# Patient Record
Sex: FEMALE | Race: WHITE | NOT HISPANIC OR LATINO | Employment: OTHER | URBAN - METROPOLITAN AREA
[De-identification: names, ages, dates, MRNs, and addresses within clinical notes are randomized per-mention and may not be internally consistent; named-entity substitution may affect disease eponyms.]

---

## 2019-05-10 ENCOUNTER — OFFICE VISIT (OUTPATIENT)
Dept: FAMILY MEDICINE | Facility: CLINIC | Age: 79
End: 2019-05-10

## 2019-05-10 VITALS
RESPIRATION RATE: 20 BRPM | HEART RATE: 72 BPM | SYSTOLIC BLOOD PRESSURE: 146 MMHG | BODY MASS INDEX: 24.65 KG/M2 | DIASTOLIC BLOOD PRESSURE: 80 MMHG | WEIGHT: 144.38 LBS | TEMPERATURE: 98.2 F | HEIGHT: 64 IN

## 2019-05-10 DIAGNOSIS — N81.11 CYSTOCELE, MIDLINE: ICD-10-CM

## 2019-05-10 DIAGNOSIS — N95.2 ATROPHIC VAGINITIS: ICD-10-CM

## 2019-05-10 DIAGNOSIS — R30.0 DYSURIA: Primary | ICD-10-CM

## 2019-05-10 LAB
ALBUMIN (URINE): ABNORMAL MG/DL
APPEARANCE UR: ABNORMAL
BACTERIA, UR: ABNORMAL
BILIRUB UR QL: ABNORMAL
CASTS/LPF: ABNORMAL
COLOR UR: YELLOW
EP/HPF: ABNORMAL
GLUCOSE URINE: ABNORMAL MG/DL
HGB UR QL: ABNORMAL
KETONES UR QL: ABNORMAL MG/DL
LEUKOCYTE ESTERASE - QUEST: ABNORMAL
MISC.: ABNORMAL
NITRITE UR QL STRIP: ABNORMAL
PH UR STRIP: 6 PH (ref 5–7)
RBC, UR MICRO: ABNORMAL (ref ?–2)
SP. GRAVITY: 1.01
UROBILINOGEN UR QL STRIP: 0.2 EU/DL (ref 0.2–1)
WBC, UR MICRO: ABNORMAL (ref ?–2)

## 2019-05-10 PROCEDURE — 99213 OFFICE O/P EST LOW 20 MIN: CPT | Performed by: FAMILY MEDICINE

## 2019-05-10 PROCEDURE — 81001 URINALYSIS AUTO W/SCOPE: CPT | Performed by: FAMILY MEDICINE

## 2019-05-10 RX ORDER — LISINOPRIL 10 MG/1
10 TABLET ORAL DAILY
COMMUNITY
Start: 2019-05-10 | End: 2022-09-16

## 2019-05-10 RX ORDER — ATORVASTATIN CALCIUM 20 MG/1
20 TABLET, FILM COATED ORAL DAILY
COMMUNITY
Start: 2019-05-10 | End: 2022-09-16

## 2019-05-10 RX ORDER — UBIDECARENONE 30 MG
CAPSULE ORAL
COMMUNITY
Start: 2019-05-10

## 2019-05-10 ASSESSMENT — MIFFLIN-ST. JEOR: SCORE: 1120.93

## 2019-05-10 NOTE — NURSING NOTE
Ronnie HIWOT Ruffin is here for consult for a possible issue with her bladder.    Questioned patient about current smoking habits.  Pt. quit smoking some time ago.  PULSE regular  My Chart: declines  CLASSIFICATION OF OVERWEIGHT AND OBESITY BY BMI                        Obesity Class           BMI(kg/m2)  Underweight                                    < 18.5  Normal                                         18.5-24.9  Overweight                                     25.0-29.9  OBESITY                     I                  30.0-34.9                             II                 35.0-39.9  EXTREME OBESITY             III                >40                            Patient's  BMI Body mass index is 24.98 kg/m .  http://hin.nhlbi.nih.gov/menuplanner/menu.cgi  Pre-visit planning  Immunizations - up to date  Colonoscopy -   Mammogram -   Asthma -   PHQ9 -    PALAK-7 -

## 2019-05-10 NOTE — PROGRESS NOTES
"SUBJECTIVE:79 year old female presents with the following concern    History of vaginal prolapse/ cystocele, wears a pessary    Recently treated for her first UTI while in [place]  She recently returned to [place]  She complains of burning- irritation  Sees a \"third hole\" when looking at her anatomy    She has been prescribed vaginal estrogen- but does not like using  She removes her pessary- and reinserts with out difficulty    Patient Active Problem List   Diagnosis     Health Care Home     Essential hypertension, benign     Postmenopausal atrophic vaginitis     Disorder of bone and cartilage     Advance care planning     Past Surgical History:   Procedure Laterality Date     C DEXA,BONE DENSITY,APPENDICULR SKELTN       C VAGINAL HYSTERECTOMY      Hysterectomy, vaginal     HC COLONOSCOPY THRU STOMA, DIAGNOSTIC  1996    normal     HC REMOVAL OF TONSILS,<13 Y/O      Tonsillectomy, under 14 yrs     Current Outpatient Medications   Medication     aspirin 81 MG tablet     atorvastatin (LIPITOR) 20 MG tablet     Coenzyme Q10 (COQ10) 30 MG CAPS     lisinopril (PRINIVIL/ZESTRIL) 10 MG tablet     Multiple Vitamins-Minerals (OCUVITE ADULT FORMULA PO)     sulfamethoxazole-trimethoprim (BACTRIM DS/SEPTRA DS) 800-160 MG tablet     No current facility-administered medications for this visit.          OBJECTIVE:  /80 (BP Location: Right arm, Patient Position: Chair, Cuff Size: Adult Regular)   Pulse 72   Temp 98.2  F (36.8  C) (Oral)   Resp 20   Ht 1.619 m (5' 3.75\")   Wt 65.5 kg (144 lb 6.1 oz)   BMI 24.98 kg/m    No acute distress  Pessary is removed- atrophic vaginitis  No unusual discharge- no odor  Urethral caruncle-  Cystocele noted    UA : 5-10 rbc, no wbc    Assessment   No sign  Vaginal infection or UTI    PLAN:  Recommended using her vaginal estrogen and monitoring her symptoms  Follow up with GYN if persistent concerns               Just got back from bladder-  Just got treated for her first UTI in " [place]    Has estrogen cream- does not like to use

## 2019-05-14 ENCOUNTER — OFFICE VISIT (OUTPATIENT)
Dept: FAMILY MEDICINE | Facility: CLINIC | Age: 79
End: 2019-05-14

## 2019-05-14 VITALS
WEIGHT: 142 LBS | TEMPERATURE: 99 F | BODY MASS INDEX: 24.57 KG/M2 | SYSTOLIC BLOOD PRESSURE: 138 MMHG | OXYGEN SATURATION: 99 % | DIASTOLIC BLOOD PRESSURE: 60 MMHG | HEART RATE: 84 BPM

## 2019-05-14 DIAGNOSIS — N30.01 ACUTE CYSTITIS WITH HEMATURIA: Primary | ICD-10-CM

## 2019-05-14 LAB
ALBUMIN (URINE): 100 MG/DL
APPEARANCE UR: ABNORMAL
BACTERIA, UR: ABNORMAL
BILIRUB UR QL: ABNORMAL
CASTS/LPF: ABNORMAL
COLOR UR: YELLOW
EP/HPF: ABNORMAL
GLUCOSE URINE: ABNORMAL MG/DL
HGB UR QL: ABNORMAL
KETONES UR QL: ABNORMAL MG/DL
LEUKOCYTE ESTERASE - QUEST: ABNORMAL
MISC.: ABNORMAL
NITRITE UR QL STRIP: ABNORMAL
PH UR STRIP: 6 PH (ref 5–7)
RBC, UR MICRO: ABNORMAL (ref ?–2)
SP. GRAVITY: 1.01
UROBILINOGEN UR QL STRIP: 0.2 EU/DL (ref 0.2–1)
WBC, UR MICRO: ABNORMAL (ref ?–2)

## 2019-05-14 PROCEDURE — 81001 URINALYSIS AUTO W/SCOPE: CPT | Performed by: PHYSICIAN ASSISTANT

## 2019-05-14 PROCEDURE — 99213 OFFICE O/P EST LOW 20 MIN: CPT | Performed by: PHYSICIAN ASSISTANT

## 2019-05-14 RX ORDER — SULFAMETHOXAZOLE/TRIMETHOPRIM 800-160 MG
1 TABLET ORAL 2 TIMES DAILY
Qty: 14 TABLET | Refills: 0 | Status: SHIPPED | OUTPATIENT
Start: 2019-05-14 | End: 2020-04-09

## 2019-05-14 NOTE — PROGRESS NOTES
SUBJECTIVE:   Ronnie Ruffin is a 79 year old female who presents to clinic today for the following   health issues:      Concern - UTI  Was seen last week with Dr. Monteiro for UTI like sx - burning in vaginal area    Pt has cystocele for the last 4 years and pessary for the last 3.  She had hysterectomy approx 1977 and oophorectomy bilaterally (she can't recall why)      UTI on 4/5  In [place] -   Put on Macrobid   Sx resolved after tx.     Friday - developed sensation again  Burning sensation on labia  Pt told she had Polyp on urethra by Dr. Monteiro  Does have Overflow incontinence     Pt is Sexually active  No vaginal discharge or  odors  OTC: Monistat 3 days  About 10 days ago and this did not change her sx.     She did start using topical estrogen on Friday      Reviewed  and updated as needed this visit by clinical staff  Tobacco  Allergies  Meds  Problems         Reviewed and updated as needed this visit by Provider         Patient Active Problem List   Diagnosis     Health Care Home     Essential hypertension, benign     Postmenopausal atrophic vaginitis     Disorder of bone and cartilage     Advance care planning     Past Surgical History:   Procedure Laterality Date     C DEXA,BONE DENSITY,APPENDICULR SKELTN       C VAGINAL HYSTERECTOMY      Hysterectomy, vaginal     HC COLONOSCOPY THRU STOMA, DIAGNOSTIC  1996    normal     HC REMOVAL OF TONSILS,<13 Y/O      Tonsillectomy, under 12 yrs       Social History     Tobacco Use     Smoking status: Former Smoker     Smokeless tobacco: Never Used   Substance Use Topics     Alcohol use: Yes     Alcohol/week: 0.5 oz     Types: 1 drink(s) per week     Family History   Problem Relation Age of Onset     Cancer Father         lymphoma     Cancer Paternal Grandfather      Cerebrovascular Disease Paternal Grandfather      Heart Disease Maternal Aunt      Heart Disease Mother      Heart Disease Maternal Grandfather      Diabetes No family hx of          Current Outpatient  Medications   Medication Sig Dispense Refill     aspirin 81 MG tablet Take by mouth daily       atorvastatin (LIPITOR) 20 MG tablet Take 1 tablet (20 mg) by mouth daily       Coenzyme Q10 (COQ10) 30 MG CAPS        lisinopril (PRINIVIL/ZESTRIL) 10 MG tablet Take 1 tablet (10 mg) by mouth daily       Multiple Vitamins-Minerals (OCUVITE ADULT FORMULA PO) Take by mouth daily       sulfamethoxazole-trimethoprim (BACTRIM DS/SEPTRA DS) 800-160 MG tablet Take 1 tablet by mouth 2 times daily 14 tablet 0     Allergies   Allergen Reactions     Nitrofurazone      BP Readings from Last 3 Encounters:   05/14/19 138/60   05/10/19 146/80   06/24/16 130/70    Wt Readings from Last 3 Encounters:   05/14/19 64.4 kg (142 lb)   05/10/19 65.5 kg (144 lb 6.1 oz)   06/24/16 62.4 kg (137 lb 9.6 oz)                    ROS:  Constitutional, HEENT, cardiovascular, pulmonary, gi and gu systems are negative, except as otherwise noted.    OBJECTIVE:     /60 (BP Location: Right arm, Patient Position: Sitting, Cuff Size: Adult Regular)   Pulse 84   Temp 99  F (37.2  C) (Oral)   Wt 64.4 kg (142 lb)   SpO2 99%   BMI 24.57 kg/m    Body mass index is 24.57 kg/m .     GENERAL: healthy, alert and no distress  EYES: Eyes grossly normal to inspection, PERRL and conjunctivae and sclerae normal  NECK: no adenopathy, no asymmetry, masses, or scars and thyroid normal to palpation  RESP: lungs clear to auscultation - no rales, rhonchi or wheezes  CV: regular rate and rhythm, normal S1 S2, no S3 or S4, no murmur, click or rub, no peripheral edema and peripheral pulses strong  ABDOMEN: soft, nontender, no hepatosplenomegaly, no masses and bowel sounds normal  No flank tenderness  MS: no gross musculoskeletal defects noted, no edema  SKIN: no suspicious lesions or rashes  NEURO: Normal strength and tone, mentation intact and speech normal  PSYCH: mentation appears normal, affect normal/bright    Diagnostic Test Results:  Results for orders placed or  performed in visit on 05/14/19 (from the past 24 hour(s))   HCL URINALYSIS, ROUTINE   Result Value Ref Range    Color Urine Yellow     Appearance Urine Cloudy (A)     Glucose Urine Neg neg mg/dL    Bilirubin Urine Neg neg    Ketones Urine Neg neg mg/dL    Specific Gravity 1.015     Blood Urine Mod (A) neg    pH Urine 6.0 5.0 - 7.0 pH    Albumin Urine 100 (A) neg - neg mg/dL    Urobilinogen Urine 0.2 0.2 - 1.0 EU/dL    Nitrite Urine Neg NEG    Leukocyte Esterase SMALL (A) neg - neg    Wbc, Urine Micro 15-25 (A) neg - 2    RBC Micro Urine 4-8 (A) neg - 2    EP/HPF few     Bacteria Urine SMALL (A) neg - neg    Casts/LPF neg     Miscellaneous clumping of WBC's (A)        ASSESSMENT/PLAN:       (N30.01) Acute cystitis with hematuria  (primary encounter diagnosis)  Comment: new  Plan: Urine Culture Aerobic Bacterial, HCL         URINALYSIS, ROUTINE,         sulfamethoxazole-trimethoprim (BACTRIM         DS/SEPTRA DS) 800-160 MG tablet          UC in [place] showed susceptibility to Bactrim  BID for 7 days  UC pending  Push water  Discussed Cipro for potential pyelonephritis however due to Black Box warning will start with Bactrim as she is afebrile now.    Will call with lab culture             AYAH Orellana  [facility]

## 2019-05-14 NOTE — LETTER
May 17, 2019      Ronnie MI Augustin  [address] [address]        Dear ,    We are writing to inform you of your test results.    There was some bacteria in your urine culture and Bactrim was the appropriate antibiotic. Please return to the clinic if you symptoms continue after treatment.        Resulted Orders   Urine Culture Aerobic Bacterial   Result Value Ref Range    Urine Voided Culture SEE NOTE (A)       Comment:        CULTURE, URINE, ROUTINE         MICRO NUMBER:      [id number]    TEST STATUS:       FINAL    SPECIMEN SOURCE:   URINE    SPECIMEN QUALITY:  ADEQUATE    RESULT:            50,000-100,000 CFU/mL of Escherichia coli                            E.coli                            ----------------                            INT   SCOTT     AMOX/CLAVULANATE       S     <=2     AMPICILLIN             S     <=2     AMP/SULBACTAM          S     <=2     CEFAZOLIN              NR    <=4 **2     CEFEPIME               S     <=1     CEFTRIAXONE            S     <=1     CIPROFLOXACIN          S     <=0.25     ERTAPENEM              S     <=0.5     GENTAMICIN             S     <=1     IMIPENEM               S     <=0.25     LEVOFLOXACIN           S     <=0.12     NITROFURANTOIN         S     <=16     PIP/TAZOBACTAM         S     <=4     TOBRAMYCIN             S     <=1     TRIMETHOPRIM/SULFA     S     <=20  S=Susceptible  I=Intermediate  R=Resistant  * = Not Tested  NR = Not Reported  **NN = See Therapy C  omments  THERAPY COMMENTS      Note 1:      For infections other than uncomplicated UTI      caused by E. coli, K. pneumoniae or P. mirabilis:      Cefazolin is resistant if SCOTT > or = 8 mcg/mL.      (Distinguishing susceptible versus intermediate      for isolates with SCOTT < or = 4 mcg/mL requires      additional testing.)      Note 2:      For uncomplicated UTI caused by E. coli,      K. pneumoniae or P. mirabilis: Cefazolin is      susceptible if SCOTT <32 mcg/mL and predicts       susceptible to the oral agents cefaclor, cefdinir,      cefpodoxime, cefprozil, cefuroxime, cephalexin      and loracarbef.     HCL URINALYSIS, ROUTINE   Result Value Ref Range    Color Urine Yellow     Appearance Urine Cloudy (A)     Glucose Urine Neg neg mg/dL    Bilirubin Urine Neg neg    Ketones Urine Neg neg mg/dL    Specific Gravity 1.015     Blood Urine Mod (A) neg    pH Urine 6.0 5.0 - 7.0 pH    Albumin Urine 100 (A) neg - neg mg/dL    Urobilinogen Urine 0.2 0.2 - 1.0 EU/dL    Nitrite Urine Neg NEG    Leukocyte Esterase SMALL (A) neg - neg    Wbc, Urine Micro 15-25 (A) neg - 2    RBC Micro Urine 4-8 (A) neg - 2    EP/HPF few     Bacteria Urine SMALL (A) neg - neg    Casts/LPF neg     Miscellaneous clumping of WBC's (A)        If you have any questions or concerns, please call the clinic at the number listed above.       Sincerely,        AYAH Orellana

## 2019-05-16 LAB — URINE VOIDED CULTURE: ABNORMAL

## 2019-07-26 ENCOUNTER — TRANSFERRED RECORDS (OUTPATIENT)
Dept: FAMILY MEDICINE | Facility: CLINIC | Age: 79
End: 2019-07-26

## 2020-01-01 NOTE — NURSING NOTE
Ronnie is here for possible UTI has cystocele and has burning and fever    Pre-visit Screening:  Immunizations:  up to date  Colonoscopy:  NA dt age  Mammogram: NA d/t age  Asthma Action Test/Plan:  NA  PHQ9:  None  GAD7:  None  Questioned patient about current smoking habits Pt. has never smoked.  Ok to leave detailed message on voice mail for today's visit only Yes, phone #        Yes

## 2020-04-09 ENCOUNTER — OFFICE VISIT (OUTPATIENT)
Dept: FAMILY MEDICINE | Facility: CLINIC | Age: 80
End: 2020-04-09

## 2020-04-09 VITALS
DIASTOLIC BLOOD PRESSURE: 82 MMHG | HEART RATE: 66 BPM | RESPIRATION RATE: 18 BRPM | WEIGHT: 142.2 LBS | SYSTOLIC BLOOD PRESSURE: 124 MMHG | HEIGHT: 63 IN | OXYGEN SATURATION: 99 % | BODY MASS INDEX: 25.2 KG/M2 | TEMPERATURE: 98 F

## 2020-04-09 DIAGNOSIS — H00.11 CHALAZION OF RIGHT UPPER EYELID: Primary | ICD-10-CM

## 2020-04-09 DIAGNOSIS — H10.45 CHRONIC ALLERGIC CONJUNCTIVITIS: ICD-10-CM

## 2020-04-09 DIAGNOSIS — L90.0 LICHEN SCLEROSUS: ICD-10-CM

## 2020-04-09 PROBLEM — J31.0 CHRONIC RHINITIS: Status: ACTIVE | Noted: 2019-06-27

## 2020-04-09 PROBLEM — L80 VITILIGO: Status: ACTIVE | Noted: 2019-09-24

## 2020-04-09 PROCEDURE — 99213 OFFICE O/P EST LOW 20 MIN: CPT | Performed by: FAMILY MEDICINE

## 2020-04-09 RX ORDER — AZELASTINE HYDROCHLORIDE 0.5 MG/ML
1 SOLUTION/ DROPS OPHTHALMIC 2 TIMES DAILY
Qty: 6 ML | Refills: 0 | Status: SHIPPED | OUTPATIENT
Start: 2020-04-09 | End: 2024-05-16

## 2020-04-09 RX ORDER — ESTRADIOL 0.1 MG/G
CREAM VAGINAL
COMMUNITY
Start: 2020-02-11 | End: 2022-09-16

## 2020-04-09 RX ORDER — CLOBETASOL PROPIONATE 0.5 MG/G
OINTMENT TOPICAL
COMMUNITY
Start: 2020-02-11 | End: 2022-09-16

## 2020-04-09 ASSESSMENT — MIFFLIN-ST. JEOR: SCORE: 1090.17

## 2020-04-09 NOTE — PROGRESS NOTES
SUBJECTIVE: 80 year old female complaining of left eyelid irritation and itchy followed by some swelling and tenderness for 4 day(s).   The patient describes struggles with eyelid blepharitis wearing eyeliner over night and has a gentle shampoo wash her eyelids from [facility].   The patient denies a history of vision changes, discharge or orbital pain.   Smoking history: No.   Relevant past medical history: positive for chronic post nasal drainage with ? Allergies/ uses daily harish pot.    OBJECTIVE: The patient appears healthy, alert, no distress, cooperative, smiling and fatigued.  EYE:Left upper eyelid blepharitis with chalazion formation midline. Eyes are normal. PERRLA, corneas and conjunctivae normal. Fundi are normal, no papilledema, hemorrhages or exudates. No AV crossing changes. Clear tears    EARS: negative  NOSE/SINUS: positive findings: mucosa swollen, pale, and boggy, clear rhinorrhea   THROAT: normal   NECK:Neck supple. No adenopathy. Thyroid symmetric, normal size,, Carotids without bruits.   CHEST: Clear    ASSESSMENT: (H00.11) Chalazion of right upper eyelid  (primary encounter diagnosis)  Comment: war pack gently  Plan: azelastine (OPTIVAR) 0.05 % ophthalmic solution        Upper eyelid hygiene cleaning    (H10.45) Chronic allergic conjunctivitis  Plan: azelastine (OPTIVAR) 0.05 % ophthalmic solution        Potential medication side effects were discussed with the patient; let me know if any occur.      (L90.0) Lichen sclerosus  Plan: estradiol (ESTRACE) 0.1 MG/GM vaginal cream,         clobetasol (TEMOVATE) 0.05 % external ointment        I have reviewed the patient's medical history in detail and updated the computerized patient record.

## 2020-04-09 NOTE — NURSING NOTE
Ronnie is here today for a swollen eyelid.    Pre-visit Screening:  Immunizations:  up to date  Colonoscopy:  is up to date  Mammogram: is up to date  Asthma Action Test/Plan:  LUZ ELENA  PHQ9:  NA  GAD7:  NA  Questioned patient about current smoking habits Pt. quit smoking some time ago.  Ok to leave detailed message on voice mail for today's visit only Yes, phone # [phone]

## 2020-04-09 NOTE — PATIENT INSTRUCTIONS
Chalazion of right upper eyelid  (primary encounter diagnosis)  Comment: war pack gently  Plan: azelastine (OPTIVAR) 0.05 % ophthalmic solution        Upper eyelid hygiene cleaning    (H10.45) Chronic allergic conjunctivitis  Plan: azelastine (OPTIVAR) 0.05 % ophthalmic solution        Potential medication side effects were discussed with the patient; let me know if any occur.

## 2020-04-16 ENCOUNTER — OFFICE VISIT (OUTPATIENT)
Dept: FAMILY MEDICINE | Facility: CLINIC | Age: 80
End: 2020-04-16

## 2020-04-16 VITALS
WEIGHT: 143 LBS | OXYGEN SATURATION: 95 % | BODY MASS INDEX: 25.34 KG/M2 | DIASTOLIC BLOOD PRESSURE: 80 MMHG | TEMPERATURE: 98.4 F | HEIGHT: 63 IN | SYSTOLIC BLOOD PRESSURE: 126 MMHG | RESPIRATION RATE: 18 BRPM | HEART RATE: 74 BPM

## 2020-04-16 DIAGNOSIS — N36.2 URETHRAL CARUNCLE: ICD-10-CM

## 2020-04-16 DIAGNOSIS — N95.2 ATROPHIC VAGINITIS: ICD-10-CM

## 2020-04-16 DIAGNOSIS — R30.0 DYSURIA: Primary | ICD-10-CM

## 2020-04-16 LAB
ALBUMIN (URINE): ABNORMAL MG/DL
APPEARANCE UR: CLEAR
BACTERIA, UR: ABNORMAL
BILIRUB UR QL: ABNORMAL
CASTS/LPF: ABNORMAL
COLOR UR: YELLOW
EP/HPF: ABNORMAL
GLUCOSE URINE: ABNORMAL MG/DL
HGB UR QL: ABNORMAL
KETONES UR QL: ABNORMAL MG/DL
LEUKOCYTE ESTERASE - QUEST: ABNORMAL
MISC.: ABNORMAL
NITRITE UR QL STRIP: ABNORMAL
PH UR STRIP: 7 PH (ref 5–7)
RBC, UR MICRO: ABNORMAL (ref ?–2)
SP. GRAVITY: 1.01
UROBILINOGEN UR QL STRIP: 0.2 EU/DL (ref 0.2–1)
WBC, UR MICRO: ABNORMAL (ref ?–2)

## 2020-04-16 PROCEDURE — 81001 URINALYSIS AUTO W/SCOPE: CPT | Performed by: FAMILY MEDICINE

## 2020-04-16 PROCEDURE — 99213 OFFICE O/P EST LOW 20 MIN: CPT | Performed by: FAMILY MEDICINE

## 2020-04-16 ASSESSMENT — MIFFLIN-ST. JEOR: SCORE: 1093.8

## 2020-04-16 NOTE — PROGRESS NOTES
"SUBJECTIVE:   Ronnie Ruffin is an 80 year old year old who presents with suspected urinary tract   infection. Her symptoms began 3 days ago and   include pain when she has her pessary out and thinks she saw blood in her urine but mostly after she wiped on the toilet paper    Predisposing factors: atrophic vaginitis on estrogen, cystocele with removable pessary ring, urethral polyp  Hx of previous UTI s: occasional   Sexually active: yes,single partner, menopause    Current Outpatient Medications   Medication     atorvastatin (LIPITOR) 20 MG tablet     azelastine (OPTIVAR) 0.05 % ophthalmic solution     clobetasol (TEMOVATE) 0.05 % external ointment     Coenzyme Q10 (COQ10) 30 MG CAPS     estradiol (ESTRACE) 0.1 MG/GM vaginal cream     lisinopril (PRINIVIL/ZESTRIL) 10 MG tablet     No current facility-administered medications for this visit.           Allergies   Allergen Reactions     Nitrofurazone        OBJECTIVE:   Vitals:    04/16/20 1321   BP: 126/80   BP Location: Right arm   Patient Position: Sitting   Cuff Size: Adult Large   Pulse: 74   Temp: 98.4  F (36.9  C)   TempSrc: Oral   SpO2: 95%   Weight: 64.9 kg (143 lb)   Height: 1.594 m (5' 2.75\")       General appearance: Alert, oriented, well hydrated. Skin turgor normal.    Hydration: well hydrated   CVA tenderness to percussion: none  Abdomen: The abdomen is soft without tenderness, guarding, mass or organomegaly. Bowel sounds are normal. No CVA tenderness or inguinal adenopathy noted.    Tenderness: none   Masses: none  Organomegaly: none  GYN: Small soft blood tinged urethral caruncle. Vagina and vulva are normal;  no discharge is noted. Pessary in place without vaginal discharge/ dryness noted.  No vaginal mass or tenderness.  Adnexa normal in size without masses or tenderness.    UA:Urine dipstick shows positive for RBC's and positive for leukocytes.  Micro exam: 0-2 WBC's per HPF, 2-5 RBC's per HPF and few bacteria.     ASSESSMENT/PLAN: (R30.0) Dysuria "  (primary encounter diagnosis)  Comment: await culture  Plan: HCL  Urinalysis, Routine (BFP), Urine Culture         Aerobic Bacterial        Patient was instructed to drink plenty of fluids, urinate frequently and to contact the office promptly should she notice fever greater than 102, increase in discomfort, skin rash, lack of improvement after 2 days of treatment, or the appearance of new symptoms.      (N36.2) Urethral caruncle  Comment: reviewed the irritation I see  Plan: HCL  Urinalysis, Routine (BFP), Urine Culture         Aerobic Bacterial        Monitor symptoms. Consider a urology specialists/ ? carlos a    (N95.2) Atrophic vaginitis  Plan: use estrogen cream the next 3 nights.

## 2020-04-16 NOTE — NURSING NOTE
Ronnie is here today for a possible UTI.    Pre-visit Screening:  Immunizations:  up to date  Colonoscopy:  is up to date  Mammogram: is up to date  Asthma Action Test/Plan:  LUZ ELENA  PHQ9:  NA  GAD7:  NA  Questioned patient about current smoking habits Pt. has never smoked.  Ok to leave detailed message on voice mail for today's visit only Yes, phone # [phone]

## 2020-04-16 NOTE — PATIENT INSTRUCTIONS
Dysuria  (primary encounter diagnosis)  Comment: await culture  Plan: HCL  Urinalysis, Routine (BFP), Urine Culture         Aerobic Bacterial        Patient was instructed to drink plenty of fluids, urinate frequently and to contact the office promptly should she notice fever greater than 102, increase in discomfort, skin rash, lack of improvement after 2 days of treatment, or the appearance of new symptoms.      (N36.2) Urethral caruncle  Comment: reviewed the irritation I see  Plan: HCL  Urinalysis, Routine (BFP), Urine Culture         Aerobic Bacterial        Monitor symptoms. Consider a urology specialists/ ? carlos a    (N95.2) Atrophic vaginitis  Plan: use estrogen cream the next 3 nights.

## 2020-04-18 LAB — URINE VOIDED CULTURE: NORMAL

## 2020-04-20 ENCOUNTER — TELEPHONE (OUTPATIENT)
Dept: FAMILY MEDICINE | Facility: CLINIC | Age: 80
End: 2020-04-20

## 2020-04-20 DIAGNOSIS — R30.0 DYSURIA: Primary | ICD-10-CM

## 2020-04-20 DIAGNOSIS — N36.2 URETHRAL CARUNCLE: ICD-10-CM

## 2020-04-20 NOTE — TELEPHONE ENCOUNTER
Negative urine culture. Options reviewed.    [facility]   [address] [place], [place] [address] [phone] -- appt line   [phone] -- fax     She will schedule

## 2021-08-10 ENCOUNTER — OFFICE VISIT (OUTPATIENT)
Dept: FAMILY MEDICINE | Facility: CLINIC | Age: 81
End: 2021-08-10

## 2021-08-10 VITALS
HEIGHT: 63 IN | BODY MASS INDEX: 24.98 KG/M2 | WEIGHT: 141 LBS | OXYGEN SATURATION: 98 % | TEMPERATURE: 97.3 F | SYSTOLIC BLOOD PRESSURE: 120 MMHG | HEART RATE: 70 BPM | DIASTOLIC BLOOD PRESSURE: 68 MMHG

## 2021-08-10 DIAGNOSIS — Z96.0 VAGINAL PESSARY IN SITU: ICD-10-CM

## 2021-08-10 DIAGNOSIS — R30.0 DYSURIA: ICD-10-CM

## 2021-08-10 DIAGNOSIS — R31.9 HEMATURIA, UNSPECIFIED TYPE: Primary | ICD-10-CM

## 2021-08-10 DIAGNOSIS — N30.01 ACUTE CYSTITIS WITH HEMATURIA: ICD-10-CM

## 2021-08-10 DIAGNOSIS — N81.11 CYSTOCELE, MIDLINE: ICD-10-CM

## 2021-08-10 LAB
APPEARANCE UR: ABNORMAL
BACTERIA, UR: ABNORMAL
BILIRUB UR QL: ABNORMAL
CASTS/LPF: ABNORMAL
COLOR UR: ABNORMAL
EP/HPF: ABNORMAL
GLUCOSE URINE: ABNORMAL MG/DL
HGB UR QL: ABNORMAL
KETONES UR QL: ABNORMAL MG/DL
MISC.: ABNORMAL
NITRITE UR QL STRIP: ABNORMAL
PH UR STRIP: 5.5 PH (ref 5–7)
PROT UR QL: 100 MG/DL
RBC, UR MICRO: ABNORMAL (ref ?–2)
SP GR UR STRIP: ABNORMAL (ref 1–1.03)
UROBILINOGEN UR QL STRIP: 0.2 EU/DL (ref 0.2–1)
WBC #/AREA URNS HPF: ABNORMAL /[HPF]
WBC, UR MICRO: ABNORMAL (ref ?–2)

## 2021-08-10 PROCEDURE — 99213 OFFICE O/P EST LOW 20 MIN: CPT | Performed by: PHYSICIAN ASSISTANT

## 2021-08-10 PROCEDURE — 81001 URINALYSIS AUTO W/SCOPE: CPT | Performed by: PHYSICIAN ASSISTANT

## 2021-08-10 RX ORDER — NITROFURANTOIN 25; 75 MG/1; MG/1
100 CAPSULE ORAL 2 TIMES DAILY
Qty: 14 CAPSULE | Refills: 0 | Status: SHIPPED | OUTPATIENT
Start: 2021-08-10 | End: 2021-08-17

## 2021-08-10 ASSESSMENT — MIFFLIN-ST. JEOR: SCORE: 1079.73

## 2021-08-10 NOTE — NURSING NOTE
Chief Complaint   Patient presents with     UTI     Pt has urgency and blood in urine for 2 days.     Urinary Problem         Pre-visit Screening:  Immunizations:  up to date  Colonoscopy:  is up to date  Mammogram: is up to date  Asthma Action Test/Plan:  NA  PHQ9:  NA  GAD7:  NA  Questioned patient about current smoking habits Pt. quit smoking some time ago.  Ok to leave detailed message on voice mail for today's visit only Yes, phone # cell

## 2021-08-10 NOTE — PROGRESS NOTES
Chief Complaint   Patient presents with     UTI     Pt has urgency and blood in urine for 2 days.     Urinary Problem     SUBJECTIVE  Ronnie Ruffin in for a possible UTI.  Symptoms started 4 day(s) ago. Symptoms include  urgency, frequency, burning and voiding in small amounts  She denies suprapubic pain and pressure, back pain, nausea, vomiting, fever and chills. She does have a history of uti's as recently as 1/2021.  She denies any unusual vaginal discharge or odor.     She is sexually active.  LMP Postmenopause.     Recent abx use? No  Flank pain?  No    OTC treatment? Cranberry extract    Current Outpatient Medications   Medication     atorvastatin (LIPITOR) 20 MG tablet     azelastine (OPTIVAR) 0.05 % ophthalmic solution     clobetasol (TEMOVATE) 0.05 % external ointment     Coenzyme Q10 (COQ10) 30 MG CAPS     estradiol (ESTRACE) 0.1 MG/GM vaginal cream     lisinopril (PRINIVIL/ZESTRIL) 10 MG tablet     nitroFURantoin macrocrystal-monohydrate (MACROBID) 100 MG capsule     No current facility-administered medications for this visit.       Patient Active Problem List    Diagnosis Date Noted     Cystocele, midline 08/10/2021     Priority: Medium     Vaginal pessary in situ 08/10/2021     Priority: Medium     Lichen sclerosus 09/24/2019     Priority: Medium     Vitiligo 09/24/2019     Priority: Medium     Chronic rhinitis 06/27/2019     Priority: Medium     Advance care planning 01/27/2015     Priority: Medium     Health Care Home 06/20/2013     Priority: Medium     State Tier Level:  1  Status:  n/a  Care Coordinator:      See Letters for [facility] Care Plan           Essential hypertension, benign 06/20/2013     Priority: Medium     Postmenopausal atrophic vaginitis 06/20/2013     Priority: Medium     Disorder of bone and cartilage 06/20/2013     Priority: Medium     Problem list name updated by automated process. Provider to review         OBJECTIVE:    /68 (BP Location: Left arm, Patient Position: Sitting,  "Cuff Size: Adult Regular)   Pulse 70   Temp 97.3  F (36.3  C) (Temporal)   Ht 1.594 m (5' 2.75\")   Wt 64 kg (141 lb)   SpO2 98%   BMI 25.18 kg/m      Patient is a 81 year old female, in no acute distress. Vitals noted   Cardiac:  Normal rhythm and rate, no murmurs, rubs or gallops.  Lungs: clear to auscultation bilaterally; no wheezes, crackles or rhonchi  Abdomen:  Bowel sounds normal. Soft,  not distended, no masses, no hepatosplenomegaly, non-tender.  There is not CVA tenderness.    Urinalysis:WBCs, RBCs, Bacteria, nitrates     Culture pending?  Yes      ASSESSMENT:  1. Hematuria, unspecified type    2. Dysuria    3. Vaginal pessary in situ    4. Cystocele, midline    5. Acute cystitis with hematuria            PLAN:  UA consistent with cystitis. No evidence of pyelonephritis. Recommended start abx while culture pending.     Rx: nitrofurantoin BID 7 days. Tolerated in 2004.      Push fluids, frequent voiding, acetominophen/ibuprofen prn as tolerated.  Call or return to clinic prn if these symptoms worsen or fail to improve as anticipated.  Repeat UA indicated: No    Ronnie Ruffin understands and agrees with the plan.    Adriana Zavala PA-C      "

## 2021-08-12 LAB — URINE VOIDED CULTURE: ABNORMAL

## 2021-08-13 ENCOUNTER — TELEPHONE (OUTPATIENT)
Dept: FAMILY MEDICINE | Facility: CLINIC | Age: 81
End: 2021-08-13

## 2022-09-16 ENCOUNTER — OFFICE VISIT (OUTPATIENT)
Dept: FAMILY MEDICINE | Facility: CLINIC | Age: 82
End: 2022-09-16

## 2022-09-16 VITALS
TEMPERATURE: 97.2 F | WEIGHT: 144 LBS | DIASTOLIC BLOOD PRESSURE: 78 MMHG | SYSTOLIC BLOOD PRESSURE: 122 MMHG | RESPIRATION RATE: 20 BRPM | BODY MASS INDEX: 25.52 KG/M2 | HEIGHT: 63 IN | HEART RATE: 68 BPM

## 2022-09-16 DIAGNOSIS — L30.9 DERMATITIS: ICD-10-CM

## 2022-09-16 DIAGNOSIS — L90.0 LICHEN SCLEROSUS: ICD-10-CM

## 2022-09-16 DIAGNOSIS — N39.498 OTHER URINARY INCONTINENCE: ICD-10-CM

## 2022-09-16 DIAGNOSIS — N95.2 POSTMENOPAUSAL ATROPHIC VAGINITIS: ICD-10-CM

## 2022-09-16 DIAGNOSIS — Z96.0 VAGINAL PESSARY IN SITU: ICD-10-CM

## 2022-09-16 DIAGNOSIS — N81.11 CYSTOCELE, MIDLINE: ICD-10-CM

## 2022-09-16 DIAGNOSIS — E78.00 PURE HYPERCHOLESTEROLEMIA: ICD-10-CM

## 2022-09-16 DIAGNOSIS — M85.89 OSTEOPENIA OF MULTIPLE SITES: ICD-10-CM

## 2022-09-16 DIAGNOSIS — I10 ESSENTIAL HYPERTENSION, BENIGN: ICD-10-CM

## 2022-09-16 DIAGNOSIS — Z00.00 ROUTINE GENERAL MEDICAL EXAMINATION AT A HEALTH CARE FACILITY: Primary | ICD-10-CM

## 2022-09-16 PROBLEM — L80 VITILIGO: Status: RESOLVED | Noted: 2019-09-24 | Resolved: 2022-09-16

## 2022-09-16 LAB
ALBUMIN SERPL-MCNC: 4.4 G/DL (ref 3.6–5.1)
ALBUMIN/GLOB SERPL: 1.6 {RATIO} (ref 1–2.5)
ALP SERPL-CCNC: 64 U/L (ref 33–130)
ALT 1742-6: 14 U/L (ref 0–32)
APPEARANCE UR: CLEAR
AST 1920-8: 19 U/L (ref 0–35)
BACTERIA, UR: ABNORMAL
BILIRUB SERPL-MCNC: 0.7 MG/DL (ref 0.2–1.2)
BILIRUB UR QL: ABNORMAL
BUN SERPL-MCNC: 25 MG/DL (ref 7–25)
BUN/CREATININE RATIO: 27.5
CALCIUM SERPL-MCNC: 9.5 MG/DL (ref 8.6–10.3)
CASTS/LPF: ABNORMAL
CHLORIDE SERPLBLD-SCNC: 100.3 MMOL/L (ref 98–110)
CHOLEST SERPL-MCNC: 151 MG/DL (ref 0–199)
CHOLEST/HDLC SERPL: 3 {RATIO} (ref 0–5)
CO2 SERPL-SCNC: 27.9 MMOL/L (ref 20–32)
COLOR UR: YELLOW
CREAT SERPL-MCNC: 0.91 MG/DL (ref 0.6–1.3)
EP/HPF: ABNORMAL
GLOBULIN, CALCULATED - QUEST: 2.7 (ref 1.9–3.7)
GLUCOSE SERPL-MCNC: 91 MG/DL (ref 60–99)
GLUCOSE URINE: ABNORMAL MG/DL
HDLC SERPL-MCNC: 59 MG/DL (ref 40–150)
HGB UR QL: ABNORMAL
KETONES UR QL: ABNORMAL MG/DL
LDLC SERPL CALC-MCNC: 70 MG/DL (ref 0–130)
MISC.: ABNORMAL
NITRITE UR QL STRIP: ABNORMAL
PH UR STRIP: 5.5 PH (ref 5–7)
POTASSIUM SERPL-SCNC: 4.37 MMOL/L (ref 3.5–5.3)
PROT SERPL-MCNC: 7.1 G/DL (ref 6.1–8.1)
PROT UR QL: ABNORMAL MG/DL
RBC, UR MICRO: ABNORMAL (ref ?–2)
SODIUM SERPL-SCNC: 135.5 MMOL/L (ref 135–146)
SP GR UR STRIP: 1.01 (ref 1–1.03)
TRIGL SERPL-MCNC: 109 MG/DL (ref 0–149)
UROBILINOGEN UR QL STRIP: 0.2 EU/DL (ref 0.2–1)
WBC #/AREA URNS HPF: ABNORMAL /[HPF]
WBC, UR MICRO: ABNORMAL (ref ?–2)

## 2022-09-16 PROCEDURE — 36415 COLL VENOUS BLD VENIPUNCTURE: CPT | Performed by: PHYSICIAN ASSISTANT

## 2022-09-16 PROCEDURE — 81001 URINALYSIS AUTO W/SCOPE: CPT | Performed by: PHYSICIAN ASSISTANT

## 2022-09-16 PROCEDURE — G0008 ADMIN INFLUENZA VIRUS VAC: HCPCS | Performed by: PHYSICIAN ASSISTANT

## 2022-09-16 PROCEDURE — 80053 COMPREHEN METABOLIC PANEL: CPT | Performed by: PHYSICIAN ASSISTANT

## 2022-09-16 PROCEDURE — 90662 IIV NO PRSV INCREASED AG IM: CPT | Performed by: PHYSICIAN ASSISTANT

## 2022-09-16 PROCEDURE — 80061 LIPID PANEL: CPT | Performed by: PHYSICIAN ASSISTANT

## 2022-09-16 PROCEDURE — 99397 PER PM REEVAL EST PAT 65+ YR: CPT | Mod: 25 | Performed by: PHYSICIAN ASSISTANT

## 2022-09-16 RX ORDER — ATORVASTATIN CALCIUM 20 MG/1
20 TABLET, FILM COATED ORAL DAILY
Qty: 90 TABLET | Refills: 3 | Status: SHIPPED | OUTPATIENT
Start: 2022-09-16

## 2022-09-16 RX ORDER — FLUOCINONIDE 0.5 MG/G
CREAM TOPICAL
COMMUNITY
Start: 2021-11-04 | End: 2022-09-16

## 2022-09-16 RX ORDER — FLUOCINONIDE 0.5 MG/G
CREAM TOPICAL
Qty: 30 G | Refills: 3 | Status: SHIPPED | OUTPATIENT
Start: 2022-09-16

## 2022-09-16 RX ORDER — CLOBETASOL PROPIONATE 0.5 MG/G
OINTMENT TOPICAL
Qty: 30 G | Refills: 3 | Status: SHIPPED | OUTPATIENT
Start: 2022-09-16

## 2022-09-16 RX ORDER — MV-MN/OM3/DHA/EPA/FISH/LUT/ZEA 250-5-1 MG
1 CAPSULE ORAL DAILY
COMMUNITY
Start: 2022-09-16

## 2022-09-16 RX ORDER — LISINOPRIL 10 MG/1
10 TABLET ORAL DAILY
Qty: 90 TABLET | Refills: 3 | Status: SHIPPED | OUTPATIENT
Start: 2022-09-16

## 2022-09-16 RX ORDER — ESTRADIOL 0.1 MG/G
CREAM VAGINAL
Qty: 42.5 G | Refills: 0 | Status: SHIPPED | OUTPATIENT
Start: 2022-09-16

## 2022-09-16 NOTE — PROGRESS NOTES
SUBJECTIVE:   CC: Ronnie Ruffin is an 82 year old woman who presents for preventive health visit.           Patient has been advised of split billing requirements and indicates understanding: Yes  HPI      Pt is new to our clinic.    I have reviewed the following histories: Past Medical History, Past Surgical History, Social History, Family History, Problem List, Medication List and Allergies    DEXA - osteopenia at [facility] - DEXA 2021  Milk 3x  A day plus cheese and veggies  Never treated for osteopenia/osteoporosis    Prolapse bladder - pessary  Polyp on urethra - monitoring    Lichen sclerosis - vaginal   Topical steroid 2x a week.     Lisionpril - HTN - 10-15 years  Not checking BP at home    Atorvastatin 20 mg - 5 years ago started this      Schedule for next summer at [facility].       Allergies - tested 2 years ago  Post nasal drainage - Flonase          Today's PHQ-2 Score:   PHQ-2 ( 1999 Pfizer) 9/16/2022   Q1: Little interest or pleasure in doing things 0   Q2: Feeling down, depressed or hopeless 0   PHQ-2 Score 0   PHQ-2 Total Score (12-17 Years)- Positive if 3 or more points; Administer PHQ-A if positive -       Abuse: Current or Past (Physical, Sexual or Emotional) - No  Do you feel safe in your environment? Yes    Breast cancer screening discussion: getting yearly        Reviewed orders with patient.  Reviewed health maintenance and updated orders accordingly - Yes      Lab work is in process  Labs reviewed in EPIC  BP Readings from Last 3 Encounters:   09/16/22 122/78   08/10/21 120/68   04/16/20 126/80    Wt Readings from Last 3 Encounters:   09/16/22 65.3 kg (144 lb)   08/10/21 64 kg (141 lb)   04/16/20 64.9 kg (143 lb)                  Patient Active Problem List   Diagnosis     Health Care Home     Essential hypertension, benign     Postmenopausal atrophic vaginitis     Advance care planning     Lichen sclerosus     Chronic rhinitis     Cystocele, midline     Vaginal pessary in situ      Osteopenia of multiple sites     Other urinary incontinence     Dermatitis     Pure hypercholesterolemia     Past Surgical History:   Procedure Laterality Date     C DEXA,BONE DENSITY,APPENDICULR SKELTN       HC REMOVAL OF TONSILS,<11 Y/O      Tonsillectomy, under 12 yrs     OOPHORECTOMY Bilateral      ZZC VAGINAL HYSTERECTOMY      Hysterectomy, vaginal     ZZHC COLONOSCOPY THRU STOMA, DIAGNOSTIC  01/01/1996    normal       Social History     Tobacco Use     Smoking status: Former Smoker     Smokeless tobacco: Never Used     Tobacco comment: [age] years old - ppd.   Substance Use Topics     Alcohol use: Yes     Alcohol/week: 3.0 standard drinks     Types: 3 drink(s) per week     Family History   Problem Relation Age of Onset     Heart Disease Mother         angina     Macular Degeneration Mother      Lymphoma Father 70     Heart Disease Brother      Esophageal Cancer Brother      Pulmonary Embolism Brother      Heart Disease Maternal Grandfather      Cancer Paternal Grandfather      Cerebrovascular Disease Paternal Grandfather      Heart Disease Maternal Aunt      Diabetes No family hx of          Current Outpatient Medications   Medication Sig Dispense Refill     atorvastatin (LIPITOR) 20 MG tablet Take 1 tablet (20 mg) by mouth daily 90 tablet 3     azelastine (OPTIVAR) 0.05 % ophthalmic solution Place 1 drop Into the left eye 2 times daily 6 mL 0     clobetasol (TEMOVATE) 0.05 % external ointment APPLY TO AFFECTED AREA TWICE A DAY FOR 4 WEEKS THEN EVERYDAY FOR 4 WEEKS THEN 3 TIMES WEEKLY 30 g 3     Coenzyme Q10 (COQ10) 30 MG CAPS        estradiol (ESTRACE) 0.1 MG/GM vaginal cream INSERT 1 GRAM VAGINALLY TWICE A WEEK AS DIRECTED 42.5 g 0     fluocinonide (LIDEX) 0.05 % external cream APPLY TO AFFECTED AREA TWICE DAILY FOR NO MORE THAN 14 DAY 30 g 3     lisinopril (ZESTRIL) 10 MG tablet Take 1 tablet (10 mg) by mouth daily 90 tablet 3     Multiple Vitamins-Minerals (OCUVITE ADULT 50+) CAPS Take 1 capsule by  "mouth daily       Allergies   Allergen Reactions     Nitrofurazone      Recent Labs   Lab Test 09/28/16  1018   LDL 74   HDL 67   TRIG 94                           Past Medical History:   Diagnosis Date     Benign neoplasm of colon 1992    Neg.     Essential hypertension, benign 6/20/2013     Lichen sclerosus 9/24/2019     Postmenopausal atrophic vaginitis 6/20/2013     Vitiligo 9/24/2019      Past Surgical History:   Procedure Laterality Date     C DEXA,BONE DENSITY,APPENDICULR SKELTN       HC REMOVAL OF TONSILS,<11 Y/O      Tonsillectomy, under 12 yrs     OOPHORECTOMY Bilateral      ZZC VAGINAL HYSTERECTOMY      Hysterectomy, vaginal     ZZHC COLONOSCOPY THRU STOMA, DIAGNOSTIC  01/01/1996    normal       Review of Systems  CONSTITUTIONAL: NEGATIVE for fever, chills, change in weight  INTEGUMENTARU/SKIN: occ eczema  EYES: NEGATIVE for vision changes or irritation  ENT: PND  RESP: NEGATIVE for significant cough or SOB  BREAST: NEGATIVE for masses, tenderness or discharge  CV: NEGATIVE for chest pain, palpitations or peripheral edema  GI: NEGATIVE for nausea, abdominal pain, heartburn, or change in bowel habits  : some urinary leakage  MUSCULOSKELETAL: NEGATIVE for significant arthralgias or myalgia  NEURO: NEGATIVE for weakness, dizziness or paresthesias  PSYCHIATRIC: NEGATIVE for changes in mood or affect     OBJECTIVE:   /78 (BP Location: Left arm, Patient Position: Chair, Cuff Size: Adult Regular)   Pulse 68   Temp 97.2  F (36.2  C) (Temporal)   Resp 20   Ht 1.594 m (5' 2.75\")   Wt 65.3 kg (144 lb)   BMI 25.71 kg/m    Physical Exam  GENERAL: healthy, alert and no distress  EYES: Eyes grossly normal to inspection, PERRL and conjunctivae and sclerae normal  HENT: ear canals and TM's normal, nose and mouth without ulcers or lesions  NECK: no adenopathy, no asymmetry, masses, or scars and thyroid normal to palpation  RESP: lungs clear to auscultation - no rales, rhonchi or wheezes  BREAST: normal " without masses, tenderness or nipple discharge and no palpable axillary masses or adenopathy  CV: regular rate and rhythm, normal S1 S2, no S3 or S4, no murmur, click or rub, no peripheral edema and peripheral pulses strong  ABDOMEN: soft, nontender, no hepatosplenomegaly, no masses and bowel sounds normal   (female):  atrophic external genitalia, normal urethral meatus, vaginal mucosa pink.    MS: no gross musculoskeletal defects noted, no edema  SKIN: no suspicious lesions or rashes  NEURO: Normal strength and tone, mentation intact and speech normal  PSYCH: mentation appears normal, affect normal/bright    Diagnostic Test Results:  Labs reviewed in Epic    ASSESSMENT/PLAN:   Ronnie was seen today for physical.    Diagnoses and all orders for this visit:    Routine general medical examination at a health care facility  -     VENOUS COLLECTION  -     Comprehensive Metobolic Panel (B[facility])  -     Lipid Panel ([facility])    Osteopenia of multiple sites  -     T4 FREE ([facility])  -     TSH ([facility])  -     VITAMIN D DEFICIENCY SCREENING ([facility])  -     PTH, Intact and Calcium ([facility])  -     Magnesium ([facility])  -     PHOSPHORUS ([facility])    Postmenopausal atrophic vaginitis    Vaginal pessary in situ    Lichen sclerosus  -     clobetasol (TEMOVATE) 0.05 % external ointment; APPLY TO AFFECTED AREA TWICE A DAY FOR 4 WEEKS THEN EVERYDAY FOR 4 WEEKS THEN 3 TIMES WEEKLY  -     estradiol (ESTRACE) 0.1 MG/GM vaginal cream; INSERT 1 GRAM VAGINALLY TWICE A WEEK AS DIRECTED    Essential hypertension, benign  -     VENOUS COLLECTION  -     Comprehensive Metobolic Panel (BFP)  -     lisinopril (ZESTRIL) 10 MG tablet; Take 1 tablet (10 mg) by mouth daily    Cystocele, midline  -     URINALYSIS, ROUTINE (B[facility])    Pure hypercholesterolemia  -     VENOUS COLLECTION  -     Comprehensive Metobolic Panel (BFP)  -     Lipid Panel (BFP)  -     atorvastatin (LIPITOR) 20 MG tablet; Take 1 tablet (20 mg) by mouth daily    Dermatitis  -     fluocinonide (LIDEX)  "0.05 % external cream; APPLY TO AFFECTED AREA TWICE DAILY FOR NO MORE THAN 14 DAY    Other urinary incontinence  Consider pelvic floor therapy     Other orders  -     INFLUENZA, QUAD, HIGH DOSE, PF, 65YR + (FLUZONE HD)        Patient has been advised of split billing requirements and indicates understanding: Yes    COUNSELING:  Reviewed preventive health counseling, as reflected in patient instructions    Estimated body mass index is 25.71 kg/m  as calculated from the following:    Height as of this encounter: 1.594 m (5' 2.75\").    Weight as of this encounter: 65.3 kg (144 lb).        She reports that she has quit smoking. She has never used smokeless tobacco.      Counseling Resources:  ATP IV Guidelines  Pooled Cohorts Equation Calculator  Breast Cancer Risk Calculator  BRCA-Related Cancer Risk Assessment: FHS-7 Tool  FRAX Risk Assessment  ICSI Preventive Guidelines  Dietary Guidelines for Americans, 2010  USDA's MyPlate  ASA Prophylaxis  Lung CA Screening    AYAH Orellana  [facility]    "

## 2022-09-16 NOTE — LETTER
September 21, 2022      Ronnie [place] Augustin  [address] [id number]        Dear ,    Labs all looked great except your Vitamin D was low. Please start taking 5000 international unit(s) Vitamin D3 daily. I'd like to see you in clinic in 6 months to recheck this please.     Resulted Orders   URINALYSIS, ROUTINE (B[facility])   Result Value Ref Range    Color Urine Yellow     Appearance Urine Clear     Glucose Urine Neg neg mg/dL    Bilirubin Urine Neg neg    Ketones Urine Neg neg mg/dL    Specific Gravity Urine 1.015 1.003 - 1.035    Blood Urine Trace (A) neg    pH Urine 5.5 5.0 - 7.0 pH    Protein Urine neg neg - neg mg/dL    Urobilinogen Urine 0.2 0.2 - 1.0 EU/dL    Nitrite Urine Neg NEG    Leukocytes neg     Wbc, Urine Micro 0-1 neg - 2    RBC Micro Urine 0-1 neg - 2    EP/HPF neg     Bacteria Urine neg neg - neg    Casts/LPF neg     Miscellaneous neg    Comprehensive Metobolic Panel (BFP)   Result Value Ref Range    Carbon Dioxide 27.9 20 - 32 mmol/L    Creatinine 0.91 0.60 - 1.30 mg/dL    Glucose 91 60 - 99 mg/dL    Sodium 135.5 135 - 146 mmol/L    Potassium 4.37 3.5 - 5.3 mmol/L    Chloride 100.3 98 - 110 mmol/L    Protein Total 7.1 6.1 - 8.1 g/dL    Albumin 4.4 3.6 - 5.1 g/dL    Alkaline Phosphatase 64 33 - 130 U/L    ALT 14 0 - 32 U/L    AST 19 0 - 35 U/L    Bilirubin Total 0.7 0.2 - 1.2 mg/dL    Urea Nitrogen 25 7 - 25 mg/dL    Calcium 9.5 8.6 - 10.3 mg/dL    BUN/Creatinine Ratio 27.5     Globulin Calculated 2.7 1.9 - 3.7    A/G Ratio 1.6 1 - 2.5   Lipid Panel (BFP)   Result Value Ref Range    Cholesterol 151 0 - 199 mg/dL    Triglycerides 109 0 - 149 mg/dL    HDL Cholesterol 59 40 - 150 mg/dL    LDL Cholesterol Direct 70 0 - 130 mg/dL    Cholesterol/HDL Ratio 3 0 - 5   T4 FREE ([facility])   Result Value Ref Range    T4 Free, Non-Dialysis 1.3 0.8 - 1.8 ng/dL   TSH ([facility])   Result Value Ref Range    TSH 3.31 0.40 - 4.50 mIU/L   VITAMIN D DEFICIENCY SCREENING ([facility])   Result Value Ref Range     Vitamin D 25-OH Total 25 (L) 30 - 100 ng/mL      Comment:      Vitamin D Status         25-OH Vitamin D:     Deficiency:                    <20 ng/mL  Insufficiency:             20 - 29 ng/mL  Optimal:                 > or = 30 ng/mL     For 25-OH Vitamin D testing on patients on   D2-supplementation and patients for whom quantitation   of D2 and D3 fractions is required, the QuestAssureD(TM)  25-OH VIT D, (D2,D3), LC/MS/MS is recommended: order   code 09768 (patients >2yrs).  See Note 1     Note 1     For additional information, please refer to   http://[email or web address]/DKH844   (This link is being provided for informational/  educational purposes only.)     PTH, Intact and Calcium ([facility])   Result Value Ref Range    PTH Intact 29 16 - 77 pg/mL      Comment:         Interpretive Guide    Intact PTH           Calcium  ------------------    ----------           -------  Normal Parathyroid    Normal               Normal  Hypoparathyroidism    Low or Low Normal    Low  Hyperparathyroidism     Primary            Normal or High       High     Secondary          High                 Normal or Low     Tertiary           High                 High  Non-Parathyroid     Hypercalcemia      Low or Low Normal    High         Calcium 9.6 8.6 - 10.4 mg/dL   Magnesium ([facility])   Result Value Ref Range    Magnesium 2.0 1.5 - 2.5 mg/dL   PHOSPHORUS ([facility])   Result Value Ref Range    Phosphorus 3.8 2.1 - 4.3 mg/dL       If you have any questions or concerns, please call the clinic at the number listed above.       Sincerely,      AYAH Orellana

## 2022-09-16 NOTE — NURSING NOTE
Ronnie Ruffin is here for a CPX.    Pre-visit planning  Immunizations -Flu vac today  Colonoscopy -  Mammogram -  Asthma test --  PHQ9 -  PALAK 7 -      Questioned patient about current smoking habits.  Pt. quit smoking some time ago.  Body mass index is 25.71 kg/m .  PULSE regular  My Chart: declines  CLASSIFICATION OF OVERWEIGHT AND OBESITY BY BMI                        Obesity Class           BMI(kg/m2)  Underweight                                    < 18.5  Normal                                         18.5-24.9  Overweight                                     25.0-29.9  OBESITY                     I                  30.0-34.9                             II                 35.0-39.9  EXTREME OBESITY             III                >40                            Patient's  BMI Body mass index is 25.71 kg/m .     The patient has verbalized that it is ok to leave a detailed voice message on the patient's cell phone with results/recommendations from this visit.

## 2022-09-17 LAB
MAGNESIUM SERPL-MCNC: 2 MG/DL (ref 1.5–2.5)
PHOSPHATE SERPL-MCNC: 3.8 MG/DL (ref 2.1–4.3)
T4, FREE, NON-DIALYSIS - QUEST: 1.3 NG/DL (ref 0.8–1.8)
TSH SERPL-ACNC: 3.31 MIU/L (ref 0.4–4.5)
VITAMIN D, 25-OH, TOTAL - QUEST: 25 NG/ML (ref 30–100)

## 2022-09-19 LAB
CALCIUM SERPL-MCNC: 9.6 MG/DL (ref 8.6–10.4)
PTH, INTACT: 29 PG/ML (ref 16–77)

## 2022-10-17 NOTE — PATIENT INSTRUCTIONS
Preventive Health Recommendations    See your health care provider every year to    Review health changes.     Discuss preventive care.      Review your medicines if your doctor has prescribed any.      You no longer need a yearly Pap test unless you've had an abnormal Pap test in the past 10 years. If you have vaginal symptoms, such as bleeding or discharge, be sure to talk with your provider about a Pap test.      Every 1 to 2 years, have a mammogram.  If you are over 69, talk with your health care provider about whether or not you want to continue having screening mammograms.      Every 10 years, have a colonoscopy. Or, have a yearly FIT test (stool test). These exams will check for colon cancer.       Have a cholesterol test every 5 years, or more often if your doctor advises it.       Have a diabetes test (fasting glucose) every three years. If you are at risk for diabetes, you should have this test more often.       At age 65, have a bone density scan (DEXA) to check for osteoporosis (brittle bone disease).    Shots:    Get a flu shot each year.    Get a tetanus shot every 10 years.    Talk to your doctor about your pneumonia vaccines. There are now two you should receive - Pneumovax (PPSV 23) and Prevnar (PCV 13).    Talk to your pharmacist about the shingles vaccine.    Talk to your doctor about the hepatitis B vaccine.    Nutrition:     Eat at least 5 servings of fruits and vegetables each day.      Eat whole-grain bread, whole-wheat pasta and brown rice instead of white grains and rice.      Get adequate about Calcium and Vitamin D.     Lifestyle    Exercise at least 150 minutes a week (30 minutes a day, 5 days a week). This will help you control your weight and prevent disease.      Limit alcohol to one drink per day.      No smoking.       Wear sunscreen to prevent skin cancer.       See your dentist twice a year for an exam and cleaning.      See your eye doctor every 1 to 2 years to screen for  conditions such as glaucoma, macular degeneration, cataracts, etc.    Personalized Prevention Plan  You are due for the preventive services outlined below.  Your care team is available to assist you in scheduling these services.  If you have already completed any of these items, please share that information with your care team to update in your medical record.    Health Maintenance Due   Topic Date Due     Discuss Advance Care Planning  01/27/2020     FALL RISK ASSESSMENT  05/14/2020     Cholesterol Lab  09/28/2021     PHQ-2 (once per calendar year)  01/01/2022     Diptheria Tetanus Pertussis (DTAP/TDAP/TD) Vaccine (6 - Td or Tdap) 06/07/2022     Flu Vaccine (1) 09/01/2022      Attending Attestation (For Attendings USE Only)...

## 2024-05-16 ENCOUNTER — OFFICE VISIT (OUTPATIENT)
Dept: FAMILY MEDICINE | Facility: CLINIC | Age: 84
End: 2024-05-16

## 2024-05-16 VITALS
BODY MASS INDEX: 25 KG/M2 | SYSTOLIC BLOOD PRESSURE: 122 MMHG | WEIGHT: 140 LBS | DIASTOLIC BLOOD PRESSURE: 70 MMHG | HEART RATE: 70 BPM | TEMPERATURE: 98 F | OXYGEN SATURATION: 97 %

## 2024-05-16 DIAGNOSIS — M79.604 PAIN IN BOTH LOWER EXTREMITIES: ICD-10-CM

## 2024-05-16 DIAGNOSIS — M79.605 PAIN IN BOTH LOWER EXTREMITIES: ICD-10-CM

## 2024-05-16 DIAGNOSIS — M54.50 ACUTE LEFT-SIDED LOW BACK PAIN WITHOUT SCIATICA: Primary | ICD-10-CM

## 2024-05-16 PROCEDURE — 99213 OFFICE O/P EST LOW 20 MIN: CPT | Performed by: FAMILY MEDICINE

## 2024-05-16 NOTE — PROGRESS NOTES
SUBJECTIVE:  Ronnie Ruffin, a 84 year old female scheduled an appointment to discuss the following issues:  Pain in both lower extremities  Pt noting pain in legs x 6 months, started in right knee, then right upper leg, then left knee and upper leg.  Pt now also has left back and hip pain. Py does have a hx of LBP- never very bothersome.     Pain seems to be worsening -worse in left hip/back.    NO swelling . NO locking.     Pt has hx DJD right knee and left hip.     Pt has home exercises for back but has not been doing them    She has taken a single ibuprfen on occasion.    No fecal incontinence, saddle numbness, fever, or weakness.     Pt is concerned statin may be causing symptoms       Medical, social, surgical, and family histories reviewed.  Patient Active Problem List   Diagnosis    Health Care Home    Essential hypertension, benign    Postmenopausal atrophic vaginitis    Advance care planning    Lichen sclerosus    Chronic rhinitis    Cystocele, midline    Vaginal pessary in situ    Osteopenia of multiple sites    Other urinary incontinence    Dermatitis    Pure hypercholesterolemia       Past Medical History:   Diagnosis Date    Benign neoplasm of colon 1992    Neg.    Essential hypertension, benign 6/20/2013    Lichen sclerosus 9/24/2019    Postmenopausal atrophic vaginitis 6/20/2013    Vitiligo 9/24/2019       Family History   Problem Relation Age of Onset    Heart Disease Mother         angina    Macular Degeneration Mother     Lymphoma Father 70    Heart Disease Brother     Esophageal Cancer Brother     Pulmonary Embolism Brother     Heart Disease Maternal Grandfather     Cancer Paternal Grandfather     Cerebrovascular Disease Paternal Grandfather     Heart Disease Maternal Aunt     Diabetes No family hx of        Social History     Socioeconomic History    Marital status:      Spouse name: Not on file    Number of children: Not on file    Years of education: Not on file    Highest education  level: Not on file   Occupational History    Not on file   Tobacco Use    Smoking status: Former    Smokeless tobacco: Never    Tobacco comments:     [age] years old - ppd.   Substance and Sexual Activity    Alcohol use: Yes     Alcohol/week: 3.0 standard drinks of alcohol     Types: 3 drink(s) per week    Drug use: No    Sexual activity: Never     Partners: Male     Comment:    Other Topics Concern    Not on file   Social History Narrative    Not on file     Social Determinants of Health     Financial Resource Strain: Low Risk  (8/30/2022)    Received from [facility]    Overall Financial Resource Strain (CARDIA)     Difficulty of Paying Living Expenses: Not hard at all   Food Insecurity: No Food Insecurity (8/30/2022)    Received from [facility]    Hunger Vital Sign     Worried About Running Out of Food in the Last Year: Never true     Ran Out of Food in the Last Year: Never true   Transportation Needs: No Transportation Needs (8/30/2022)    Received from [facility]    PRAPARE - Transportation     Lack of Transportation (Medical): No     Lack of Transportation (Non-Medical): No   Physical Activity: Sufficiently Active (8/30/2022)    Received from [facility]    Exercise Vital Sign     Days of Exercise per Week: 5 days     Minutes of Exercise per Session: 30 min   Stress: No Stress Concern Present (8/30/2022)    Received from [facility]    [employer] - Occupational Stress Questionnaire     Feeling of Stress : Not at all   Social Connections: Moderately Integrated (8/30/2022)    Received from [facility]    Social Connection and Isolation Panel [NHANES]     Frequency of Communication with Friends and Family: More than three times a week     Frequency of Social Gatherings with Friends and Family: Twice a week     Attends Alevism Services: More than 4 times per year     Active Member of Clubs or Organizations: No     Attends Club or Organization Meetings: Never     Marital  Status:    Interpersonal Safety: Not At Risk (8/30/2022)    Received from [facility]    Humiliation, Afraid, Rape, and Kick questionnaire     Fear of Current or Ex-Partner: No     Emotionally Abused: No     Physically Abused: No     Sexually Abused: No   Housing Stability: Low Risk  (8/30/2022)    Received from [facility]    Housing Stability Vital Sign     Unable to Pay for Housing in the Last Year: No     Number of Places Lived in the Last Year: 2     In the last 12 months, was there a time when you did not have a steady place to sleep or slept in a shelter (including now)?: No       Past Surgical History:   Procedure Laterality Date    C DEXA,BONE DENSITY,APPENDICULR SKELTN      HC REMOVAL OF TONSILS,<11 Y/O      Tonsillectomy, under 12 yrs    OOPHORECTOMY Bilateral     ZZC VAGINAL HYSTERECTOMY      Hysterectomy, vaginal    ZZHC COLONOSCOPY THRU STOMA, DIAGNOSTIC  01/01/1996    normal       Current Outpatient Medications   Medication Sig Dispense Refill    atorvastatin (LIPITOR) 20 MG tablet Take 1 tablet (20 mg) by mouth daily 90 tablet 3    clobetasol (TEMOVATE) 0.05 % external ointment APPLY TO AFFECTED AREA TWICE A DAY FOR 4 WEEKS THEN EVERYDAY FOR 4 WEEKS THEN 3 TIMES WEEKLY 30 g 3    Coenzyme Q10 (COQ10) 30 MG CAPS       estradiol (ESTRACE) 0.1 MG/GM vaginal cream INSERT 1 GRAM VAGINALLY TWICE A WEEK AS DIRECTED 42.5 g 0    fluocinonide (LIDEX) 0.05 % external cream APPLY TO AFFECTED AREA TWICE DAILY FOR NO MORE THAN 14 DAY 30 g 3    lisinopril (ZESTRIL) 10 MG tablet Take 1 tablet (10 mg) by mouth daily 90 tablet 3    Multiple Vitamins-Minerals (OCUVITE ADULT 50+) CAPS Take 1 capsule by mouth daily      cholecalciferol 50 MCG (2000 UT) tablet Take 50 mcg by mouth daily       No current facility-administered medications for this visit.        Allergies: Cephalexin and Nitrofurazone      Immunization History   Administered Date(s) Administered    COVID-19 12+ (2023-24) (MODERNA) 10/02/2023     COVID-19 Bivalent 18+ (Moderna) 09/23/2022    COVID-19 Monovalent 18+ (Moderna) 12/28/2020, 01/28/2021, 10/27/2021, 05/24/2022    Hepatitis A (ADULT 19+) 04/20/2007, 05/07/2008    Influenza (High Dose) 3 valent vaccine 09/01/2014, 09/22/2015, 09/28/2017, 09/11/2018, 10/08/2019    Influenza (IIV3) PF 10/22/1999, 10/01/2006, 11/01/2006, 10/04/2010, 09/28/2012, 09/17/2013, 09/11/2014    Influenza Vaccine 65+ (FLUAD) 10/02/2023    Influenza Vaccine 65+ (Fluzone HD) 09/15/2020, 09/28/2021, 09/16/2022    Influenza Vaccine >6 months,quad, PF 09/28/2016    Pneumo Conj 13-V (2010&after) 06/15/2015    Pneumococcal 23 valent 04/01/2006, 06/19/2012    TD,PF 7+ (Tenivac) 02/01/1991, 06/28/2000    TDAP (Adacel,Boostrix) 06/07/2012, 09/06/2022    Td (Adult), Adsorbed 01/01/1991, 01/01/2000, 06/03/2010    Zoster recombinant adjuvanted (SHINGRIX) 09/11/2018, 02/02/2019    Zoster vaccine, live 04/20/2007, 06/01/2010      ROS:  CONSTITUTIONAL: NEGATIVE for fever, chills  EYES: NEGATIVE for vision changes   RESP: NEGATIVE for significant cough or SOB  CV: NEGATIVE for chest pain, palpitations   GI: NEGATIVE for nausea, abdominal pain, heartburn, or change in bowel habits  : NEGATIVE for frequency, dysuria, or hematuria  NEURO: NEGATIVE for weakness, dizziness or paresthesias or headache    OBJECTIVE:  /70 (BP Location: Left arm, Patient Position: Sitting, Cuff Size: Adult Regular)   Pulse 70   Temp 98  F (36.7  C) (Temporal)   Wt 63.5 kg (140 lb)   SpO2 97%   BMI 25.00 kg/m    EXAM:  GENERAL APPEARANCE: healthy, alert and no distress  RESP: lungs clear to auscultation - no rales, rhonchi or wheezes  CV: regular rates and rhythm, normal S1 S2, no S3 or S4 and no murmur, click or rub -  MS: pt with full ROM hips, knees, no effusion, no trochanteric TTP, neg straight leg-raise bilaterally    ASSESSMENT/PLAN:  (M54.50) Acute left-sided low back pain without sciatica  (primary encounter diagnosis)  Comment: suspect at least  part of her issue is lumbar pathology-discussed options  Plan: we agree to have her schedule with Dr Tatum, do home core exercises, judicious use ibuprofen with food    (M79.604,  M79.605) Pain in both lower extremities  Comment: as above, can try off statin for 1-2 weks ot see if any improvement but I suspect her issues are more DJD related as well as altered biomechanics from pain  Plan: as above

## 2024-05-16 NOTE — NURSING NOTE
Chief Complaint   Patient presents with    Pain     Has been having leg pain that has been going on 6 months. Started in right knee and moved up. The gradually moved to both legs and hips. Thinks her statins are to blame. She thinks  muscle aches are incorporated with statins due to google and her friends telling her.

## 2024-05-16 NOTE — PROGRESS NOTES
Ronnie Ruffin is a 84 year old female who presents for Medicare Annual Wellness Visit.    Current providers caring for this patient include:  Patient Care Team:  Ludin Tatum MD as PCP - General (Family Medicine)  Adriana Zavala PA-C as Assigned PCP    Complete Medical and Social history reviewed with patient, outlined below.    Patient Active Problem List   Diagnosis    Health Care Home    Essential hypertension, benign    Postmenopausal atrophic vaginitis    Advance care planning    Lichen sclerosus    Chronic rhinitis    Cystocele, midline    Vaginal pessary in situ    Osteopenia of multiple sites    Other urinary incontinence    Dermatitis    Pure hypercholesterolemia       Past Medical History:   Diagnosis Date    Benign neoplasm of colon 1992    Neg.    Essential hypertension, benign 6/20/2013    Lichen sclerosus 9/24/2019    Postmenopausal atrophic vaginitis 6/20/2013    Vitiligo 9/24/2019       Past Surgical History:   Procedure Laterality Date    C DEXA,BONE DENSITY,APPENDICULR SKELTN      HC REMOVAL OF TONSILS,<13 Y/O      Tonsillectomy, under 12 yrs    OOPHORECTOMY Bilateral     ZZC VAGINAL HYSTERECTOMY      Hysterectomy, vaginal    ZZHC COLONOSCOPY THRU STOMA, DIAGNOSTIC  01/01/1996    normal       Family History   Problem Relation Age of Onset    Heart Disease Mother         angina    Macular Degeneration Mother     Lymphoma Father 70    Heart Disease Brother     Esophageal Cancer Brother     Pulmonary Embolism Brother     Heart Disease Maternal Grandfather     Cancer Paternal Grandfather     Cerebrovascular Disease Paternal Grandfather     Heart Disease Maternal Aunt     Diabetes No family hx of        Social History     Tobacco Use    Smoking status: Former    Smokeless tobacco: Never    Tobacco comments:     18-45 years old - ppd.   Substance Use Topics    Alcohol use: Yes     Alcohol/week: 3.0 standard drinks of alcohol     Types: 3 drink(s) per week       Diet: {DIET  "IPPE:151551}  Physical Activity: {IPPE PHYSICAL ACTIVITY:394895}  Depression Screen:    Over the past 2 weeks, patient has felt down, depressed, or hopeless:  {YES/NO :595565::\"Yes\"}    Over the past 2 weeks, patient has felt little interest or pleasure in doing things: {YES/NO :645742::\"Yes\"}    Functional ability/Safety screen:  Up and go test (able to get up and walk longer than 30 seconds): {PASSED/FAILED:826899}  Patient needs assistance with: {IPPE ASSISTANCE:767802::\"nothing\"}  Patient's home has the following possible safety concerns: {IPPE SAFETY CONCERNS:713893::\"none identified\"}  Patient has concerns about her hearing:  {YES/NO :647634::\"Yes\"}  Cognitive Screen  Patient repeats three objects (ball, flag, tree)      Clock drawing test:   {exam normal abnormal, default normal:64091::\"NORMAL\"}  Recalls three objects after 3 minutes (ball,flag,tree):                                                                                               {MENTAL STATUS RECALL:5131729}    Physical Exam:  There were no vitals taken for this visit.   There is no height or weight on file to calculate BMI.      {PROVIDER TO UPDATE PMH, PSH, FamHX, and SocialHX:531107::\"click delete button to remove this line now\"}  {PROVIDER TO CLICK \"Refresh all smart links\" icon - double arrows on R of tool bar:184491::\"click delete button to remove this line now\"}  {Optional Additional Exam - brief:189336}    End of Life Planning:   Patient currently has an advanced directive: { :450936}    Education/Counseling:   Based on review of the above information, the following items were addressed:  {IPPE Counselin}    Appropriate preventive services were discussed with this patient, including applicable screening as appropriate for cardiovascular disease, diabetes, osteopenia/osteoporosis, and glaucoma.  As appropriate for age/gender, discussed screening for colorectal cancer, prostate cancer, breast cancer, and cervical cancer.   " "Checklist reviewing preventive services available has been given to the patient.    {PROVIDER TO CONSIDER printing smart phrase \"piwelcometomedicareqic\" in AFTER VISIT SUMMARY for patient information :211233::\"click delete button to remove this line now\"}    {Please bill a \"NO CHARGE\" so that coders can attach the proper code.  If provider is adding E & M charge beyond above, be sure to add ROS and DIAGNOSIS/PLAN:594509::\"click delete button to remove this line now\"}     "

## 2024-05-22 ENCOUNTER — TRANSFERRED RECORDS (OUTPATIENT)
Dept: FAMILY MEDICINE | Facility: CLINIC | Age: 84
End: 2024-05-22

## 2024-05-22 ENCOUNTER — OFFICE VISIT (OUTPATIENT)
Dept: FAMILY MEDICINE | Facility: CLINIC | Age: 84
End: 2024-05-22

## 2024-05-22 VITALS
OXYGEN SATURATION: 96 % | DIASTOLIC BLOOD PRESSURE: 72 MMHG | HEART RATE: 75 BPM | TEMPERATURE: 97.8 F | SYSTOLIC BLOOD PRESSURE: 118 MMHG

## 2024-05-22 DIAGNOSIS — G89.29 CHRONIC LEFT-SIDED LOW BACK PAIN WITHOUT SCIATICA: ICD-10-CM

## 2024-05-22 DIAGNOSIS — M17.0 PRIMARY OSTEOARTHRITIS OF BOTH KNEES: ICD-10-CM

## 2024-05-22 DIAGNOSIS — M16.12 PRIMARY OSTEOARTHRITIS OF LEFT HIP: ICD-10-CM

## 2024-05-22 DIAGNOSIS — M25.552 HIP PAIN, LEFT: Primary | ICD-10-CM

## 2024-05-22 DIAGNOSIS — M54.50 CHRONIC LEFT-SIDED LOW BACK PAIN WITHOUT SCIATICA: ICD-10-CM

## 2024-05-22 PROCEDURE — 72100 X-RAY EXAM L-S SPINE 2/3 VWS: CPT | Performed by: STUDENT IN AN ORGANIZED HEALTH CARE EDUCATION/TRAINING PROGRAM

## 2024-05-22 PROCEDURE — G2211 COMPLEX E/M VISIT ADD ON: HCPCS | Performed by: STUDENT IN AN ORGANIZED HEALTH CARE EDUCATION/TRAINING PROGRAM

## 2024-05-22 PROCEDURE — 99215 OFFICE O/P EST HI 40 MIN: CPT | Performed by: STUDENT IN AN ORGANIZED HEALTH CARE EDUCATION/TRAINING PROGRAM

## 2024-05-22 RX ORDER — METHYLPREDNISOLONE 4 MG
TABLET, DOSE PACK ORAL
Qty: 21 TABLET | Refills: 0 | Status: SHIPPED | OUTPATIENT
Start: 2024-05-22

## 2024-05-22 NOTE — NURSING NOTE
Chief Complaint   Patient presents with    Pain     Pain in both knees, this started in her right knee 6 months ago and is now in both, this is managabl    Hip Pain     Left hip pain for the last 6 months, pain is in the back and front of her hip, pain now radiates down her left leg, pain is worse when walking, no injuries or falls have occurred, she has been using ice/heat and ibuprofen, is experiencing some low back pain       Pre-visit Screening:  Immunizations:  up to date  Colonoscopy:  is up to date  Mammogram: is up to date  Asthma Action Test/Plan:  NA  PHQ9:  NA  GAD7:  NA  Questioned patient about current smoking habits Pt. quit smoking some time ago.  Ok to leave detailed message on voice mail for today's visit only Yes, phone # [phone]

## 2024-05-22 NOTE — PROGRESS NOTES
ASSESSMENT & PLAN      ICD-10-CM    1. Hip pain, left  M25.552 XR Lumbar Spine 2/3 Views     Physical Therapy  Referral - To a [facility] Location (Use POS/Location)      2. Primary osteoarthritis of left hip  M16.12 Physical Therapy  Referral - To a [facility] Location (Use POS/Location)      3. Chronic left-sided low back pain without sciatica  M54.50 methylPREDNISolone (MEDROL DOSEPAK) 4 MG tablet therapy pack    G89.29 Physical Therapy  Referral - To a [facility] Location (Use POS/Location)      4. Primary osteoarthritis of both knees  M17.0          Pain in knees hips and back  XRs deferred by pt  Hx and exam most c/w lumbar radiculitis, as well as OA as noted above.   Reviewed options for treatment and/or further eval, agreed on plan below    Patient Instructions   Restart statin    Start steroid pack    After completing steroid pack, can restart tylenol (1000mg 3x/day) and ibuprofen (1-2 pills 3x/day)    Gradually restart walking as you feel better    Pool is good exercise if you can    Follow-up in 1-2 months to reassess, sooner if needed - follow-up on knee pain at that time     44 minutes spent on the date of the encounter doing chart review, history and exam, documentation and further activities per the note.    Ludin Tatum MD, [facility]      -----    SUBJECTIVE  Ronnie Ruffin is a/an 84 year old female who is seen for evaluation of     Chief Complaint   Patient presents with    Pain     Pain in both knees, this started in her right knee 6 months ago and is now in both, this is managabl    Hip Pain     Left hip pain for the last 6 months, pain is in the back and front of her hip, pain now radiates down her left leg, pain is worse when walking, no injuries or falls have occurred, she has been using ice/heat and ibuprofen, is experiencing some low back pain       The patient is seen by themselves.    Date of Onset: 6  mos ago noticed R knee pain, atraumatic. Then  in recent weeks has noticed bilateral knee and thigh pain and L hip pain, wondered if related to statin. Hasn't been able to do usual walking routine due to pain  Worsened by: activity   Better with: ibuprofen  Treatments tried: rest/activity avoidance, ibuprofen, and home exercises  Associated symptoms: no distal numbness or tingling; denies swelling or warmth    Orthopedic/Surgical history:  did PT at [facility] For L hip pain 6 yrs ago with good improvement.   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      OBJECTIVE:  /72 (BP Location: Right arm, Patient Position: Sitting, Cuff Size: Adult Regular)   Pulse 75   Temp 97.8  F (36.6  C) (Temporal)   SpO2 96%    Alert, NAD  NC/AT  Sclerae anicteric  Resp nonlabored  Skin warm and dry  Speech intact.    Appropriate affect  5/5 str BLE  SILT BLE  Antalgic gait    RADIOLOGY:  Prior imaging at [facility] reviewed

## 2024-05-22 NOTE — PATIENT INSTRUCTIONS
Restart statin    Start steroid pack    After completing steroid pack, can restart tylenol (1000mg 3x/day) and ibuprofen (1-2 pills 3x/day)    Gradually restart walking as you feel better    Pool is good exercise if you can    Follow-up in 1-2 months to reassess, sooner if needed - follow-up on knee pain at that time

## 2025-06-04 ENCOUNTER — APPOINTMENT (OUTPATIENT)
Age: 85
Setting detail: DERMATOLOGY
End: 2025-06-04

## 2025-06-04 DIAGNOSIS — D485 NEOPLASM OF UNCERTAIN BEHAVIOR OF SKIN: ICD-10-CM

## 2025-06-04 DIAGNOSIS — F42.4 EXCORIATION (SKIN-PICKING) DISORDER: ICD-10-CM

## 2025-06-04 DIAGNOSIS — L82.1 OTHER SEBORRHEIC KERATOSIS: ICD-10-CM

## 2025-06-04 DIAGNOSIS — L57.0 ACTINIC KERATOSIS: ICD-10-CM

## 2025-06-04 DIAGNOSIS — L81.0 POSTINFLAMMATORY HYPERPIGMENTATION: ICD-10-CM

## 2025-06-04 DIAGNOSIS — D18.0 HEMANGIOMA: ICD-10-CM

## 2025-06-04 DIAGNOSIS — Z85.828 PERSONAL HISTORY OF OTHER MALIGNANT NEOPLASM OF SKIN: ICD-10-CM

## 2025-06-04 PROBLEM — D48.5 NEOPLASM OF UNCERTAIN BEHAVIOR OF SKIN: Status: ACTIVE | Noted: 2025-06-04

## 2025-06-04 PROBLEM — D18.01 HEMANGIOMA OF SKIN AND SUBCUTANEOUS TISSUE: Status: ACTIVE | Noted: 2025-06-04

## 2025-06-04 PROCEDURE — ? DIAGNOSIS COMMENT

## 2025-06-04 PROCEDURE — ? ADDITIONAL NOTES

## 2025-06-04 PROCEDURE — ? COUNSELING

## 2025-06-04 PROCEDURE — ? DEFER

## 2025-06-04 PROCEDURE — ? OBSERVATION

## 2025-06-04 ASSESSMENT — LOCATION DETAILED DESCRIPTION DERM
LOCATION DETAILED: RIGHT CENTRAL FRONTAL SCALP
LOCATION DETAILED: GLABELLA
LOCATION DETAILED: LEFT MEDIAL INFERIOR EYELID
LOCATION DETAILED: RIGHT DISTAL PRETIBIAL REGION
LOCATION DETAILED: RIGHT INFERIOR MEDIAL MALAR CHEEK
LOCATION DETAILED: LEFT CENTRAL MALAR CHEEK
LOCATION DETAILED: RIGHT LATERAL UPPER BACK

## 2025-06-04 ASSESSMENT — LOCATION SIMPLE DESCRIPTION DERM
LOCATION SIMPLE: GLABELLA
LOCATION SIMPLE: RIGHT CHEEK
LOCATION SIMPLE: LEFT CHEEK
LOCATION SIMPLE: RIGHT PRETIBIAL REGION
LOCATION SIMPLE: RIGHT SCALP
LOCATION SIMPLE: RIGHT UPPER BACK
LOCATION SIMPLE: LEFT INFERIOR EYELID

## 2025-06-04 ASSESSMENT — LOCATION ZONE DERM
LOCATION ZONE: FACE
LOCATION ZONE: SCALP
LOCATION ZONE: TRUNK
LOCATION ZONE: LEG
LOCATION ZONE: EYELID

## 2025-06-04 NOTE — HPI: HISTORY OF BASAL CELL CARCINOMA
What Is The Reason For Today's Visit?: Follow Up Basal Cell Carcinoma
How Many Bccs Have You Had?: more than one
Additional History: Patient presents to clinic to recheck the right frontal scalp. Mohs done on 7/15/2024. Patient notes it is healing well.

## 2025-06-04 NOTE — HPI: SKIN LESION (ACTINIC KERATOSES)
Is This A New Presentation, Or A Follow-Up?: History of Actinic Keratoses
Additional History: None at the last exam

## 2025-06-04 NOTE — HPI: SKIN LESION
What Type Of Note Output Would You Prefer (Optional)?: Bullet Format
Is This A New Presentation, Or A Follow-Up?: Skin Lesion
Additional History: Patient notes lesion was treated with cryotherapy at dermatologist in [place] in May.
Has Your Skin Lesion Been Treated?: not been treated

## 2025-06-04 NOTE — PROCEDURE: OBSERVATION
Body Location Override (Optional - Billing Will Still Be Based On Selected Body Map Location If Applicable): the right frontal scalp
Detail Level: Detailed
Size Of Lesion In Cm (Optional): 0

## 2025-06-04 NOTE — PROCEDURE: DIAGNOSIS COMMENT
Render Risk Assessment In Note?: no
Comment: Post cryotherapy at outside clinic for suspected seborrheic keratosis
Detail Level: Simple

## 2025-06-04 NOTE — PROCEDURE: ADDITIONAL NOTES
Additional Notes: Plan to recheck in August.
Detail Level: Simple
Render Risk Assessment In Note?: no

## 2025-06-09 ENCOUNTER — OFFICE VISIT (OUTPATIENT)
Dept: FAMILY MEDICINE | Facility: CLINIC | Age: 85
End: 2025-06-09

## 2025-06-09 VITALS
HEART RATE: 63 BPM | TEMPERATURE: 97.6 F | SYSTOLIC BLOOD PRESSURE: 112 MMHG | OXYGEN SATURATION: 96 % | DIASTOLIC BLOOD PRESSURE: 70 MMHG | WEIGHT: 141.2 LBS | BODY MASS INDEX: 25.21 KG/M2

## 2025-06-09 DIAGNOSIS — S40.861A INSECT BITE OF RIGHT UPPER ARM, INITIAL ENCOUNTER: Primary | ICD-10-CM

## 2025-06-09 DIAGNOSIS — R42 LIGHTHEADEDNESS: ICD-10-CM
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
## 2025-06-09 PROCEDURE — G2211 COMPLEX E/M VISIT ADD ON: HCPCS | Performed by: STUDENT IN AN ORGANIZED HEALTH CARE EDUCATION/TRAINING PROGRAM

## 2025-06-09 PROCEDURE — 99214 OFFICE O/P EST MOD 30 MIN: CPT | Performed by: STUDENT IN AN ORGANIZED HEALTH CARE EDUCATION/TRAINING PROGRAM

## 2025-06-09 NOTE — NURSING NOTE
Chief Complaint   Patient presents with    Consult     Consult for possible tick bite on R bicep that occurred on Monday 6/2/25 - did not physically see a tick, thought it was a gnat bite at first. Darker red [place] in the middle, has expanded redness - very itchy. Experienced some vertigo yesterday.      Pre-visit Screening:  Immunizations:  up to date  Colonoscopy:  up to date  Mammogram: is up to date  Asthma Action Test/Plan:  na  PHQ9:  na  GAD7:  na  Questioned patient about current smoking habits Pt. quit smoking some time ago.  Ok to leave detailed message on voice mail for today's visit only yes, phone # [phone] (home) [phone] (work)

## 2025-06-09 NOTE — PROGRESS NOTES
Assessment & Plan     1. Insect bite of right upper arm, initial encounter (Primary)  Doubt tick, her pruritic wheal more consistent with alternate insect bite. If tick, would not have been attached for >24 hours so essentially no risk of lyme. Recommend sx mgmt. Reviewed s/sx of EM and Lyme to prompt follow-up.    2. Lightheadedness  Likely positional related to weed pulling, recommend hydration and starting home BP monitoring consistently. Follow-up prn.      Follow-up for AWV at pt convenience     Patient Instructions   Benadryl cream     Follow-up if any increasing/expanding redness or other concerns      Reasons to follow-up sooner or seek emergent care reviewed.     Ludin Tatum MD, [facility]      Subjective     Ronnie Ruffin is a 85 year old female who presents to clinic today for the following health issues:    HPI   Chief Complaint   Patient presents with    Consult     Consult for possible tick bite on R bicep that occurred on Monday 6/2/25   Never saw a tick or other insect, initially thought it was a gnat bite. Awoke Tuesday with local itching. Has used bacitracin.  very itchy. Improving today.   No fevers or s/sx illness.   Felt slightly lightheaded after some yardwork yesterday bending over pulling weeds, resolved with rest and has a hx of similar sx.      Patient Active Problem List   Diagnosis    Essential hypertension, benign    Postmenopausal atrophic vaginitis    Advance care planning    Lichen sclerosus    Chronic rhinitis    Cystocele, midline    Vaginal pessary in situ    Osteopenia of multiple sites    Other urinary incontinence    Dermatitis    Pure hypercholesterolemia     Current Outpatient Medications   Medication Sig Dispense Refill    atorvastatin (LIPITOR) 20 MG tablet Take 1 tablet (20 mg) by mouth daily 90 tablet 3    cholecalciferol 50 MCG (2000 UT) tablet Take 50 mcg by mouth daily      clobetasol (TEMOVATE) 0.05 % external ointment APPLY TO AFFECTED  AREA TWICE A DAY FOR 4 WEEKS THEN EVERYDAY FOR 4 WEEKS THEN 3 TIMES WEEKLY 30 g 3    Coenzyme Q10 (COQ10) 30 MG CAPS       estradiol (ESTRACE) 0.1 MG/GM vaginal cream INSERT 1 GRAM VAGINALLY TWICE A WEEK AS DIRECTED 42.5 g 0    fluocinonide (LIDEX) 0.05 % external cream APPLY TO AFFECTED AREA TWICE DAILY FOR NO MORE THAN 14 DAY 30 g 3    lisinopril (ZESTRIL) 10 MG tablet Take 1 tablet (10 mg) by mouth daily 90 tablet 3    methylPREDNISolone (MEDROL DOSEPAK) 4 MG tablet therapy pack Follow Package Directions 21 tablet 0    Multiple Vitamins-Minerals (OCUVITE ADULT 50+) CAPS Take 1 capsule by mouth daily       No current facility-administered medications for this visit.             Objective    /70 (BP Location: Left arm, Patient Position: Sitting, Cuff Size: Adult Regular)   Pulse 63   Temp 97.6  F (36.4  C) (Temporal)   Wt 64 kg (141 lb 3.2 oz)   SpO2 96%   BMI 25.21 kg/m    Body mass index is 25.21 kg/m .  Alert, NAD  NC/AT  Sclerae anicteric  Regular  Resp nonlabored  Skin warm and dry, erythematous wheal R anterior bicep  No focal neuro deficits. Speech intact. Normal gait.  Appropriate affect       Labs reviewed.

## 2025-08-20 ENCOUNTER — APPOINTMENT (OUTPATIENT)
Age: 85
Setting detail: DERMATOLOGY
End: 2025-08-20

## 2025-08-20 DIAGNOSIS — L81.0 POSTINFLAMMATORY HYPERPIGMENTATION: ICD-10-CM

## 2025-08-20 DIAGNOSIS — L82.1 OTHER SEBORRHEIC KERATOSIS: ICD-10-CM

## 2025-08-20 DIAGNOSIS — Z71.89 OTHER SPECIFIED COUNSELING: ICD-10-CM

## 2025-08-20 DIAGNOSIS — D18.0 HEMANGIOMA: ICD-10-CM

## 2025-08-20 DIAGNOSIS — D485 NEOPLASM OF UNCERTAIN BEHAVIOR OF SKIN: ICD-10-CM

## 2025-08-20 DIAGNOSIS — F42.4 EXCORIATION (SKIN-PICKING) DISORDER: ICD-10-CM

## 2025-08-20 DIAGNOSIS — D22 MELANOCYTIC NEVI: ICD-10-CM

## 2025-08-20 DIAGNOSIS — Z85.828 PERSONAL HISTORY OF OTHER MALIGNANT NEOPLASM OF SKIN: ICD-10-CM

## 2025-08-20 DIAGNOSIS — L57.0 ACTINIC KERATOSIS: ICD-10-CM

## 2025-08-20 PROBLEM — D48.5 NEOPLASM OF UNCERTAIN BEHAVIOR OF SKIN: Status: ACTIVE | Noted: 2025-08-20

## 2025-08-20 PROBLEM — D22.5 MELANOCYTIC NEVI OF TRUNK: Status: ACTIVE | Noted: 2025-08-20

## 2025-08-20 PROBLEM — D18.01 HEMANGIOMA OF SKIN AND SUBCUTANEOUS TISSUE: Status: ACTIVE | Noted: 2025-08-20

## 2025-08-20 PROCEDURE — ? DIAGNOSIS COMMENT

## 2025-08-20 PROCEDURE — ? DEFER

## 2025-08-20 PROCEDURE — ? ADDITIONAL NOTES

## 2025-08-20 PROCEDURE — ? SUNSCREEN RECOMMENDATIONS

## 2025-08-20 PROCEDURE — ? COUNSELING

## 2025-08-20 PROCEDURE — ? OBSERVATION

## 2025-08-20 ASSESSMENT — LOCATION DETAILED DESCRIPTION DERM
LOCATION DETAILED: RIGHT DISTAL PRETIBIAL REGION
LOCATION DETAILED: SUPERIOR THORACIC SPINE
LOCATION DETAILED: INFERIOR MID FOREHEAD
LOCATION DETAILED: RIGHT CENTRAL FRONTAL SCALP
LOCATION DETAILED: RIGHT INFERIOR MEDIAL MALAR CHEEK
LOCATION DETAILED: RIGHT SUPERIOR MEDIAL MIDBACK
LOCATION DETAILED: LEFT INFERIOR MEDIAL FOREHEAD
LOCATION DETAILED: INFERIOR THORACIC SPINE
LOCATION DETAILED: MIDDLE STERNUM
LOCATION DETAILED: RIGHT LATERAL UPPER BACK
LOCATION DETAILED: EPIGASTRIC SKIN

## 2025-08-20 ASSESSMENT — LOCATION ZONE DERM
LOCATION ZONE: FACE
LOCATION ZONE: TRUNK
LOCATION ZONE: LEG
LOCATION ZONE: SCALP

## 2025-08-20 ASSESSMENT — LOCATION SIMPLE DESCRIPTION DERM
LOCATION SIMPLE: UPPER BACK
LOCATION SIMPLE: ABDOMEN
LOCATION SIMPLE: CHEST
LOCATION SIMPLE: INFERIOR FOREHEAD
LOCATION SIMPLE: RIGHT SCALP
LOCATION SIMPLE: RIGHT CHEEK
LOCATION SIMPLE: RIGHT PRETIBIAL REGION
LOCATION SIMPLE: RIGHT UPPER BACK
LOCATION SIMPLE: LEFT FOREHEAD
LOCATION SIMPLE: RIGHT LOWER BACK